# Patient Record
(demographics unavailable — no encounter records)

---

## 2018-01-29 NOTE — PDOC
History of Present Illness





- General


Chief Complaint: Injury


Stated Complaint: LEFT KNEE PAIN


Time Seen by Provider: 01/29/18 12:24


History Source: Patient





- History of Present Illness


Initial Comments: 





01/29/18 13:28


Pt presents to the ED complaining of pain to her left knee that started 

yesterday after bending and twisting with her left foot planted.  Patient felt 

a pop and fell to the ground.  She was able to walk with assistance, but walked 

with a limp.  During the night, her pain and swelling increased.  patient 

attempted to walk to the bathroom unassisted, and felt her knee give out from 

underneath her.  





Denies other injuries.  





Past History





- Past Medical History


Allergies/Adverse Reactions: 


 Allergies











Allergy/AdvReac Type Severity Reaction Status Date / Time


 


No Known Allergies Allergy   Verified 01/29/18 12:10











Home Medications: 


Ambulatory Orders





Naproxen Sodium [Aleve] 4 tab PO ONCE PRN 01/29/18 








COPD: No





- Surgical History


Cholecystectomy: Yes





- Suicide/Smoking/Psychosocial Hx


Smoking History: Current every day smoker


Have you smoked in the past 12 months: Yes


Number of Cigarettes Smoked Daily: 10


Information on smoking cessation initiated: Yes


'Breaking Loose' booklet given: 01/29/18


Hx Alcohol Use: Yes (SOCIAL)


Drug/Substance Use Hx: No


Substance Use Type: None





**Review of Systems





- Review of Systems


Able to Perform ROS?: Yes


Is the patient limited English proficient: No


Musculoskeletal: Yes: Joint Pain, Joint Swelling


All Other Systems: Reviewed and Negative





*Physical Exam





- Vital Signs


 Last Vital Signs











Temp Pulse Resp BP Pulse Ox


 


 97.8 F   77   16   116/77   95 


 


 01/29/18 12:10  01/29/18 12:10  01/29/18 12:10  01/29/18 12:10  01/29/18 12:10














- Physical Exam


General Appearance: Yes: Appropriately Dressed, Obese.  No: Nourished, Apparent 

Distress, Disheveled, Mild Distress, Moderate Distress, Severe Distress, 

Alcohol on Breath, Intoxicated, Cachetic


Extremity: positive: Normal Capillary Refill, Normal Inspection, Tender (L knee-

--able to extend and flex to 80 degrees.  + small effusion.  No tenderness over 

the patella or tibial plateau. no deformity or ecchymosis. Stable anterior and 

posterior drawer. )





Medical Decision Making





- Medical Decision Making





01/29/18 13:33


Pt presents to the ED complaining of pain and swelling to the L knee after 

twisting injury.  History and physical are consistent with ligamentous injury, 

less concerning for fracture.  Will check xray to rule out fracture, likely 

discharge home with orthopedic follow up and knee immobilizer if xrays are 

negative. 


01/29/18 14:01


Xray is negative for fracture.  will discharge patient home with a knee 

immobilizer and orthopedic follow up. 





*DC/Admit/Observation/Transfer


Diagnosis at time of Disposition: 


Knee strain


Qualifiers:


 Encounter type: initial encounter Laterality: left Qualified Code(s): S86.912A 

- Strain of unspecified muscle(s) and tendon(s) at lower leg level, left leg, 

initial encounter








- Discharge Dispostion


Disposition: HOME


Condition at time of disposition: Good


Admit: No





- Referrals


Referrals: 


Tereso Lynn MD [Staff Physician] - 





- Patient Instructions


Printed Discharge Instructions:  Knee Sprain


Additional Instructions: 


wear the knee immobilizer for comfort.  You can bear weight on your leg if you 

feel comfortable, or you can use the crutches for comfort.  Return to the ED 

for severe knee pain or swelling, unable to bend your knee, other new or 

worsening symptoms.   





Call the orthopedic doctor for follow up.  you can use motrin or alieve for 

pain. 





- Post Discharge Activity


Forms/Work/School Notes:  Back to Work

## 2019-03-02 NOTE — PDOC
Attending Attestation





- Resident


Resident Name: Vladislav Olivo





- ED Attending Attestation


I have performed the following: I have examined & evaluated the patient, The 

case was reviewed & discussed with the resident, I agree w/resident's findings 

& plan, Exceptions are as noted





- HPI


HPI: 





03/02/19 16:17


48yo female with ha, L eye redness, and epigastric pain. Pt states the ha 

started on wednesday morning. States she noticed her L eye was red. No pain in 

the eye. No drainage. No itching. No change in vision or loss of vision. Pt 

denies trauma. No neck pain or f/c. States she took 6 aleve on wednesday for 

the HA. States she had a ha again on thursday and again took aleve. She then 

developed epigastric pain that worsens when she eats. No n/v/d. No 

constipation. No cp/sob. States epigastric pain radiates across her upper 

abdomen. Pt denies urinary complaints. 





- Physicial Exam


PE: 





03/02/19 16:22


Gen: aaox3, nad


heent: PERRL, EOMI, L eye sclera is injected, with black light- small corneal 

abrasion to L lower cornea- no ulceration, mmm


neck: supple


heart: +s1s2 reg


lungs: cta b/l


abd: soft, mild epigastric ttp, no rebound or guarding, nondistended


ext: no c/c/e


neuro: cn ii-xii grossly intact, ambulates with a steady gait, muscle strength 5

/5 UE and LE, sensation intact, no focal findings, no temporal ttp, no jaw 

claudication/jaw pain





- Medical Decision Making





03/02/19 14:09








I, Dr. Gilma Fraser, DO, attest that this document has been prepared under 

my direction and personally reviewed by me in its entirety.   I further attest, 

that it accurately reflects all work, treatment, procedures and medical decision

-making performed by me.  


03/02/19 16:24


a/p: 48yo female with ha, L eye redness and epigastric pain


-suspect epigastric pain from Aleve use- no blood in stool, hx of cate 20 yrs 

ago


-will send labs, eval for renal and liver function, pancreatic enzyme


-posterior ha - neuro intact, will obtain head ct


-L eye redness, no pain or vision changes, L corneal abrasion on woods lamp- 

will start drops/ophtho eval as outpt


03/02/19 16:26


head ct negative for acute findigns


03/02/19 16:26


labs reviewed and stable, negative lipase


pt feels better after medication


stable for dc to home


needs GI follow up and Ophtho follow up


will dc with zantac for poss gastritis after aleve use this week

## 2019-03-02 NOTE — PDOC
History of Present Illness





- General


Chief Complaint: Pain


Stated Complaint: EPIGASTRIC PAIN, HEADACHE, REDNESS L EYE


Time Seen by Provider: 03/02/19 14:07





- History of Present Illness


Initial Comments: 


 49 year old female with no PMH (last checkup one year prior but without 

regular PCP followup currently) presenting with posterior headache, left eye 

redness, and epigastric burning abdominal pain. Patient states that she woke up 

with a posterior headache three days prior and started taking 4-5 naproxen per 

day for the relief. The same day as the headache she noted some left eye 

erythema without pain or visual deficit. Her headache improved with the 

naproxen but would return afterward. The headache is worse at the end of the 

day and not worse when bending over, coughing, or one day after the start of 

the headache she started to have burning epigastric abdominal pain that was 

worth with laying flat and worse with food. Denies fevers, chills, nausea, 

vomiting, diarrhea, chest pain, shortness of breath, or other symtpoms. 


03/02/19 14:27








Past History





- Past Medical History


Allergies/Adverse Reactions: 


 Allergies











Allergy/AdvReac Type Severity Reaction Status Date / Time


 


No Known Allergies Allergy   Verified 03/02/19 14:05











Home Medications: 


Ambulatory Orders





Erythromycin 0.5% Eye Ointment [Erythromycin 0.5% Eye Ointment -] 1 applic OS 

TID 5 Days #1 tube 03/02/19 


Ranitidine HCl [Zantac] 150 mg PO DAILY 14 Days #30 tablet 03/02/19 








COPD: No





- Surgical History


Cholecystectomy: Yes





- Suicide/Smoking/Psychosocial Hx


Smoking History: Current every day smoker


Have you smoked in the past 12 months: Yes


Number of Cigarettes Smoked Daily: 10


'Breaking Loose' booklet given: 01/29/18


Hx Alcohol Use: Yes (SOCIAL)


Drug/Substance Use Hx: No


Substance Use Type: None





**Review of Systems





- Review of Systems


Constitutional: Yes: Chills.  No: Diaphoresis, Fever


HEENTM: No: Eye Pain, Blurred Vision, Tearing, Recent change in vision


Respiratory: No: Cough, Orthopnea, Shortness of Breath


Cardiac (ROS): No: Chest Pain, Edema


ABD/GI: No: Abdominal Distended, Diarrhea, Nausea, Vomiting


: No: Burning, Dysuria, Discharge


Musculoskeletal: No: Gout, Joint Pain


Integumentary: No: Bruising, Erythema, Flushing, Lesions


Neurological: Yes: Headache.  No: Numbness, Paresthesia


Psychiatric: No: Anxiety, Depression





*Physical Exam





- Physical Exam


General Appearance: Yes: Nourished, Appropriately Dressed.  No: Apparent 

Distress


HEENT: positive: EOMI, RICH, Normal ENT Inspection, Normal Voice, Other (left 

eye eryhtema with left lower eye corneal abrasion)


Neck: positive: Trachea midline, Normal Thyroid, Supple.  negative: Tender, 

Rigid


Respiratory/Chest: positive: Lungs Clear, Normal Breath Sounds.  negative: 

Chest Tender, Respiratory Distress, Accessory Muscle Use


Cardiovascular: positive: Regular Rhythm, Regular Rate


Gastrointestinal/Abdominal: positive: Normal Bowel Sounds, Flat, Soft.  negative

: Tender


Lymphatic: negative: Adenopathy, Tenderness


Musculoskeletal: positive: Normal Inspection.  negative: Decreased Range of 

Motion


Extremity: positive: Normal Capillary Refill, Normal Inspection, Normal Range 

of Motion.  negative: Tender


Integumentary: positive: Normal Color, Dry, Warm


Neurologic: positive: Fully Oriented, Alert, Normal Mood/Affect, Normal Response

, Motor Strength 5/5





ED Treatment Course





- LABORATORY


CBC & Chemistry Diagram: 


 03/02/19 14:30





 03/02/19 14:30





Medical Decision Making





- Medical Decision Making


 49 year old female present with thre days of posterior headache that was 

relieved by naproxen at home but which caused an abdominal burning that was 

worsened by further use of naproxen. Her main [id number] was of abdominal pain 

on presentation as her headache seems to have resolved. She also had some left 

eye erythema that was found to be due to a mild corneal abrasion at the 

inferior eye margin. Labs and head CT negative. Will DC with eye ointment and 

zantac daily with optho follow up and reduced naproxen instructions.


03/02/19 16:12








*DC/Admit/Observation/Transfer


Diagnosis at time of Disposition: 


Abdominal pain


Qualifiers:


 Abdominal location: epigastric Qualified Code(s): R10.13 - Epigastric pain








- Discharge Dispostion


Disposition: HOME


Condition at time of disposition: Improved


Decision to Admit order: No





- Prescriptions


Prescriptions: 


Erythromycin 0.5% Eye Ointment [Erythromycin 0.5% Eye Ointment -] 1 applic OS 

TID 5 Days #1 tube


Ranitidine HCl [Zantac] 150 mg PO DAILY 14 Days #30 tablet





- Referrals


Referrals: 


Yamileth Benitez MD [Staff Physician] - 


[facility] [Provider Group]





- Patient Instructions


Printed Discharge Instructions:  DI for Corneal Abrasion, DI for Peptic Ulcer


Additional Instructions: 


Please use the ointment in your eye 4 times a day for 5 days. Please make an 

appointment with the ophthalmologist, Dr. Benitez. Please stop using the naproxen 

as this is making your stomach pain worse and is likely the cause of your 

stomach pain,. pelase avoid spicy foods. Please take the Zantac once daily for 

two weeks and follow up with your primary care doctor. if you don not have one, 

you cna use the clinic we wrote for you on this sheet. Please return to the ED 

if you have any new or worsening symptoms.





- Post Discharge Activity